# Patient Record
Sex: FEMALE | HISPANIC OR LATINO | URBAN - METROPOLITAN AREA
[De-identification: names, ages, dates, MRNs, and addresses within clinical notes are randomized per-mention and may not be internally consistent; named-entity substitution may affect disease eponyms.]

---

## 2017-06-29 ENCOUNTER — FOLLOW UP ESTABLISHED (OUTPATIENT)
Dept: URBAN - METROPOLITAN AREA CLINIC 24 | Facility: CLINIC | Age: 72
End: 2017-06-29
Payer: MEDICARE

## 2017-06-29 DIAGNOSIS — H16.143 PUNCTATE KERATITIS, BILATERAL: ICD-10-CM

## 2017-06-29 DIAGNOSIS — H26.492 OTHER SECONDARY CATARACT, LEFT EYE: ICD-10-CM

## 2017-06-29 DIAGNOSIS — H18.59 OTHER HEREDITARY CORNEAL DYSTROPHIES: ICD-10-CM

## 2017-06-29 PROCEDURE — 92025 CPTRIZED CORNEAL TOPOGRAPHY: CPT | Performed by: OPTOMETRIST

## 2017-06-29 PROCEDURE — 92014 COMPRE OPH EXAM EST PT 1/>: CPT | Performed by: OPTOMETRIST

## 2017-06-29 PROCEDURE — 92134 CPTRZ OPH DX IMG PST SGM RTA: CPT | Performed by: OPTOMETRIST

## 2017-06-29 ASSESSMENT — KERATOMETRY
OS: 41.92
OD: 42.70

## 2017-06-29 ASSESSMENT — INTRAOCULAR PRESSURE
OD: 18
OS: 13

## 2017-06-29 ASSESSMENT — VISUAL ACUITY
OD: 20/20
OS: 20/25

## 2018-07-31 ENCOUNTER — FOLLOW UP ESTABLISHED (OUTPATIENT)
Dept: URBAN - METROPOLITAN AREA CLINIC 24 | Facility: CLINIC | Age: 73
End: 2018-07-31
Payer: MEDICARE

## 2018-07-31 PROCEDURE — 92025 CPTRIZED CORNEAL TOPOGRAPHY: CPT | Performed by: OPTOMETRIST

## 2018-07-31 PROCEDURE — 92014 COMPRE OPH EXAM EST PT 1/>: CPT | Performed by: OPTOMETRIST

## 2018-07-31 ASSESSMENT — KERATOMETRY
OS: 42.01
OD: 42.62

## 2018-07-31 ASSESSMENT — VISUAL ACUITY
OS: 20/25
OD: 20/20

## 2018-07-31 ASSESSMENT — INTRAOCULAR PRESSURE
OS: 13
OD: 16

## 2018-10-16 ENCOUNTER — FOLLOW UP ESTABLISHED (OUTPATIENT)
Dept: URBAN - METROPOLITAN AREA CLINIC 24 | Facility: CLINIC | Age: 73
End: 2018-10-16
Payer: MEDICARE

## 2018-10-16 PROCEDURE — 92134 CPTRZ OPH DX IMG PST SGM RTA: CPT | Performed by: OPTOMETRIST

## 2018-10-16 PROCEDURE — 99214 OFFICE O/P EST MOD 30 MIN: CPT | Performed by: OPTOMETRIST

## 2018-10-16 ASSESSMENT — VISUAL ACUITY
OD: 20/20
OS: 20/25

## 2018-10-16 ASSESSMENT — INTRAOCULAR PRESSURE
OS: 16
OD: 18

## 2018-11-06 ENCOUNTER — Encounter (OUTPATIENT)
Dept: URBAN - METROPOLITAN AREA CLINIC 24 | Facility: CLINIC | Age: 73
End: 2018-11-06
Payer: MEDICARE

## 2018-11-06 DIAGNOSIS — Z01.818 ENCOUNTER FOR OTHER PREPROCEDURAL EXAMINATION: Primary | ICD-10-CM

## 2018-11-06 PROCEDURE — 99213 OFFICE O/P EST LOW 20 MIN: CPT | Performed by: PHYSICIAN ASSISTANT

## 2018-11-13 ENCOUNTER — SURGERY (OUTPATIENT)
Dept: URBAN - METROPOLITAN AREA SURGERY 12 | Facility: SURGERY | Age: 73
End: 2018-11-13
Payer: MEDICARE

## 2018-11-13 PROCEDURE — 66821 AFTER CATARACT LASER SURGERY: CPT | Performed by: OPHTHALMOLOGY

## 2018-11-27 ENCOUNTER — POST OP (OUTPATIENT)
Dept: URBAN - METROPOLITAN AREA CLINIC 24 | Facility: CLINIC | Age: 73
End: 2018-11-27
Payer: MEDICARE

## 2018-11-27 PROCEDURE — 92015 DETERMINE REFRACTIVE STATE: CPT | Performed by: OPTOMETRIST

## 2018-11-27 ASSESSMENT — VISUAL ACUITY
OS: 20/20
OD: 20/20

## 2018-11-27 ASSESSMENT — INTRAOCULAR PRESSURE
OS: 13
OD: 16

## 2019-05-17 ENCOUNTER — FOLLOW UP ESTABLISHED (OUTPATIENT)
Dept: URBAN - METROPOLITAN AREA CLINIC 24 | Facility: CLINIC | Age: 74
End: 2019-05-17
Payer: MEDICARE

## 2019-05-17 DIAGNOSIS — H25.811 COMBINED FORMS OF AGE-RELATED CATARACT, RIGHT EYE: ICD-10-CM

## 2019-05-17 DIAGNOSIS — H16.223 KERATOCONJUNCTIVITIS SICCA, BILATERAL: Primary | ICD-10-CM

## 2019-05-17 PROCEDURE — 68761 CLOSE TEAR DUCT OPENING: CPT | Performed by: OPTOMETRIST

## 2019-05-17 PROCEDURE — 99214 OFFICE O/P EST MOD 30 MIN: CPT | Performed by: OPTOMETRIST

## 2019-05-17 PROCEDURE — 92134 CPTRZ OPH DX IMG PST SGM RTA: CPT | Performed by: OPTOMETRIST

## 2019-05-17 ASSESSMENT — VISUAL ACUITY
OS: 20/20
OD: 20/20

## 2019-05-17 ASSESSMENT — INTRAOCULAR PRESSURE
OD: 19
OS: 15

## 2020-05-21 ENCOUNTER — FOLLOW UP ESTABLISHED (OUTPATIENT)
Dept: URBAN - METROPOLITAN AREA CLINIC 24 | Facility: CLINIC | Age: 75
End: 2020-05-21
Payer: MEDICARE

## 2020-05-21 DIAGNOSIS — H00.014 HORDEOLUM, LEFT UPPER LID: Primary | ICD-10-CM

## 2020-05-21 PROCEDURE — 92012 INTRM OPH EXAM EST PATIENT: CPT | Performed by: OPTOMETRIST

## 2020-05-21 RX ORDER — CARBOXYMETHYLCELLULOSE SODIUM 5 MG/ML
0.5 % SOLUTION/ DROPS OPHTHALMIC
Qty: 1 | Refills: 0 | Status: ACTIVE
Start: 2020-05-21

## 2020-05-21 RX ORDER — AMOXICILLIN AND CLAVULANATE POTASSIUM 875; 125 MG/1; 1/1
TABLET, FILM COATED ORAL
Qty: 14 | Refills: 0 | Status: ACTIVE
Start: 2020-05-21

## 2020-05-21 ASSESSMENT — INTRAOCULAR PRESSURE
OS: 12
OD: 15

## 2020-06-23 ENCOUNTER — FOLLOW UP ESTABLISHED (OUTPATIENT)
Dept: URBAN - METROPOLITAN AREA CLINIC 24 | Facility: CLINIC | Age: 75
End: 2020-06-23
Payer: MEDICARE

## 2020-06-23 DIAGNOSIS — H43.813 VITREOUS DEGENERATION, BILATERAL: ICD-10-CM

## 2020-06-23 PROCEDURE — 92134 CPTRZ OPH DX IMG PST SGM RTA: CPT | Performed by: OPTOMETRIST

## 2020-06-23 PROCEDURE — 92025 CPTRIZED CORNEAL TOPOGRAPHY: CPT | Performed by: OPTOMETRIST

## 2020-06-23 PROCEDURE — 92014 COMPRE OPH EXAM EST PT 1/>: CPT | Performed by: OPTOMETRIST

## 2020-06-23 RX ORDER — CLOPIDOGREL BISULFATE 75 MG/1
75 MG TABLET, FILM COATED ORAL
Qty: 0 | Refills: 0 | Status: ACTIVE
Start: 2020-06-23

## 2020-06-23 RX ORDER — CLOPIDOGREL BISULFATE 75 MG/1
75 MG TABLET, FILM COATED ORAL
Qty: 0 | Refills: 0 | Status: INACTIVE
Start: 2020-06-23 | End: 2020-06-23

## 2020-06-23 RX ORDER — ASPIRIN 81 MG/1
81 MG TABLET, CHEWABLE ORAL
Qty: 0 | Refills: 0 | Status: ACTIVE
Start: 2020-06-23

## 2020-06-23 ASSESSMENT — INTRAOCULAR PRESSURE
OS: 13
OD: 14

## 2020-06-23 ASSESSMENT — KERATOMETRY
OD: 42.44
OS: 41.60

## 2020-06-23 ASSESSMENT — VISUAL ACUITY
OD: 20/40
OS: 20/25

## 2020-07-14 ENCOUNTER — FOLLOW UP ESTABLISHED (OUTPATIENT)
Dept: URBAN - METROPOLITAN AREA CLINIC 24 | Facility: CLINIC | Age: 75
End: 2020-07-14
Payer: MEDICARE

## 2020-07-14 DIAGNOSIS — H00.015 HORDEOLUM, LEFT LOWER LID: Primary | ICD-10-CM

## 2020-07-14 PROCEDURE — 92012 INTRM OPH EXAM EST PATIENT: CPT | Performed by: OPTOMETRIST

## 2020-07-14 RX ORDER — NEOMYCIN SULFATE, POLYMYXIN B SULFATE AND DEXAMETHASONE 3.5; 10000; 1 MG/ML; [USP'U]/ML; MG/ML
SUSPENSION OPHTHALMIC
Qty: 1 | Refills: 1 | Status: ACTIVE
Start: 2020-07-14

## 2020-07-14 ASSESSMENT — INTRAOCULAR PRESSURE
OD: 13
OS: 12

## 2021-06-24 ENCOUNTER — OFFICE VISIT (OUTPATIENT)
Dept: URBAN - METROPOLITAN AREA CLINIC 24 | Facility: CLINIC | Age: 76
End: 2021-06-24
Payer: MEDICARE

## 2021-06-24 DIAGNOSIS — H18.592 OTHER HEREDITARY CORNEAL DYSTROPHIES, LEFT EYE: ICD-10-CM

## 2021-06-24 DIAGNOSIS — Z96.1 PRESENCE OF INTRAOCULAR LENS: ICD-10-CM

## 2021-06-24 PROCEDURE — 92014 COMPRE OPH EXAM EST PT 1/>: CPT | Performed by: OPTOMETRIST

## 2021-06-24 PROCEDURE — 92134 CPTRZ OPH DX IMG PST SGM RTA: CPT | Performed by: OPTOMETRIST

## 2021-06-24 PROCEDURE — 92025 CPTRIZED CORNEAL TOPOGRAPHY: CPT | Performed by: OPTOMETRIST

## 2021-06-24 ASSESSMENT — KERATOMETRY
OS: 41.80
OD: 42.53

## 2021-06-24 ASSESSMENT — INTRAOCULAR PRESSURE
OD: 15
OS: 13

## 2021-06-24 ASSESSMENT — VISUAL ACUITY
OS: 20/30
OD: 20/30

## 2021-06-24 NOTE — IMPRESSION/PLAN
Impression: Presence of intraocular lens ; OS
-- Restor IOL, s/p Yag OS Plan: Well positioned PC IOL(s).

## 2021-06-24 NOTE — IMPRESSION/PLAN
Impression: Combined forms of age-related cataract, right eye Plan: Again defers C&I OD -- lack of symptoms. Feels C&I OS was unsuccessful -- poor multifocal candidate ou (corneas) -- highly symptomatic os since restor iol.
-- will continue to monitor per discussion

## 2021-06-24 NOTE — IMPRESSION/PLAN
Impression: Other hereditary corneal dystrophies, left eye ; OS Makaylamanns OS Plan: Irregular cyl ou. Stable from previous. Updated niesha today Monitor.

## 2022-06-23 ENCOUNTER — OFFICE VISIT (OUTPATIENT)
Dept: URBAN - METROPOLITAN AREA CLINIC 24 | Facility: CLINIC | Age: 77
End: 2022-06-23
Payer: MEDICARE

## 2022-06-23 DIAGNOSIS — H43.813 VITREOUS DEGENERATION, BILATERAL: ICD-10-CM

## 2022-06-23 DIAGNOSIS — Z96.1 PRESENCE OF INTRAOCULAR LENS: ICD-10-CM

## 2022-06-23 DIAGNOSIS — H25.811 COMBINED FORMS OF AGE-RELATED CATARACT, RIGHT EYE: Primary | ICD-10-CM

## 2022-06-23 DIAGNOSIS — H18.592 OTHER HEREDITARY CORNEAL DYSTROPHIES, LEFT EYE: ICD-10-CM

## 2022-06-23 PROCEDURE — 92025 CPTRIZED CORNEAL TOPOGRAPHY: CPT | Performed by: OPTOMETRIST

## 2022-06-23 PROCEDURE — 99214 OFFICE O/P EST MOD 30 MIN: CPT | Performed by: OPTOMETRIST

## 2022-06-23 PROCEDURE — 92134 CPTRZ OPH DX IMG PST SGM RTA: CPT | Performed by: OPTOMETRIST

## 2022-06-23 ASSESSMENT — INTRAOCULAR PRESSURE
OD: 18
OS: 17

## 2022-06-23 ASSESSMENT — KERATOMETRY
OS: 41.45
OD: 42.59

## 2022-06-23 ASSESSMENT — VISUAL ACUITY
OD: 20/50
OS: 20/25

## 2022-06-23 NOTE — IMPRESSION/PLAN
Impression: Combined forms of age-related cataract, right eye Plan: Again pt defers C&I OD. Feels C&I OS was unsuccessful -- poor multifocal candidate ou (corneas) -- highly symptomatic os since restor iol.
-- will continue to monitor per discussion; recommend pt proceed when she is ready.

## 2022-06-23 NOTE — IMPRESSION/PLAN
Impression: Other hereditary corneal dystrophies, left eye ; OS
-- Salzmanns OS
-- EBMD OD Plan: Irregular cyl ou. Stable from previous. Updated niesha today Monitor. 
Continue frequent afts

## 2022-11-17 ENCOUNTER — OFFICE VISIT (OUTPATIENT)
Dept: URBAN - METROPOLITAN AREA CLINIC 24 | Facility: CLINIC | Age: 77
End: 2022-11-17
Payer: MEDICARE

## 2022-11-17 DIAGNOSIS — H10.811 PINGUECULITIS, RIGHT EYE: Primary | ICD-10-CM

## 2022-11-17 PROCEDURE — 99213 OFFICE O/P EST LOW 20 MIN: CPT | Performed by: OPTOMETRIST

## 2022-11-17 RX ORDER — PREDNISOLONE ACETATE 10 MG/ML
1 % SUSPENSION/ DROPS OPHTHALMIC
Qty: 5 | Refills: 1 | Status: ACTIVE
Start: 2022-11-17

## 2022-11-17 ASSESSMENT — INTRAOCULAR PRESSURE
OD: 16
OS: 14

## 2022-11-17 NOTE — IMPRESSION/PLAN
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

## 2023-06-27 ENCOUNTER — OFFICE VISIT (OUTPATIENT)
Dept: URBAN - METROPOLITAN AREA CLINIC 24 | Facility: CLINIC | Age: 78
End: 2023-06-27
Payer: MEDICARE

## 2023-06-27 DIAGNOSIS — H18.592 OTHER HEREDITARY CORNEAL DYSTROPHIES, LEFT EYE: ICD-10-CM

## 2023-06-27 DIAGNOSIS — H25.811 COMBINED FORMS OF AGE-RELATED CATARACT, RIGHT EYE: Primary | ICD-10-CM

## 2023-06-27 DIAGNOSIS — Z96.1 PRESENCE OF INTRAOCULAR LENS: ICD-10-CM

## 2023-06-27 DIAGNOSIS — H43.813 VITREOUS DEGENERATION, BILATERAL: ICD-10-CM

## 2023-06-27 PROCEDURE — 92134 CPTRZ OPH DX IMG PST SGM RTA: CPT | Performed by: OPTOMETRIST

## 2023-06-27 PROCEDURE — 92025 CPTRIZED CORNEAL TOPOGRAPHY: CPT | Performed by: OPTOMETRIST

## 2023-06-27 PROCEDURE — 92014 COMPRE OPH EXAM EST PT 1/>: CPT | Performed by: OPTOMETRIST

## 2023-06-27 ASSESSMENT — KERATOMETRY
OD: 42.80
OS: 41.63

## 2023-06-27 ASSESSMENT — INTRAOCULAR PRESSURE
OS: 13
OD: 15

## 2023-06-27 ASSESSMENT — VISUAL ACUITY
OS: 20/20
OD: 20/30

## 2023-06-27 NOTE — IMPRESSION/PLAN
Impression: Combined forms of age-related cataract, right eye Plan: Pt defers C&I OD. Feels C&I OS was unsuccessful and visual needs met with current SRx
-- poor multifocal candidate ou (corneas) -- highly symptomatic os since restor iol.
-- will continue to monitor per discussion; recommend pt proceed when she is ready.

## 2025-03-18 ENCOUNTER — OFFICE VISIT (OUTPATIENT)
Dept: URBAN - METROPOLITAN AREA CLINIC 28 | Facility: CLINIC | Age: 80
End: 2025-03-18
Payer: MEDICARE

## 2025-03-18 DIAGNOSIS — H00.15 CHALAZION LEFT LOWER EYELID: Primary | ICD-10-CM

## 2025-03-18 PROCEDURE — 99213 OFFICE O/P EST LOW 20 MIN: CPT | Performed by: OPTOMETRIST
